# Patient Record
Sex: MALE | Race: WHITE | Employment: FULL TIME | URBAN - NONMETROPOLITAN AREA
[De-identification: names, ages, dates, MRNs, and addresses within clinical notes are randomized per-mention and may not be internally consistent; named-entity substitution may affect disease eponyms.]

---

## 2023-07-26 ENCOUNTER — HOSPITAL ENCOUNTER (EMERGENCY)
Age: 23
Discharge: HOME | End: 2023-07-26
Payer: COMMERCIAL

## 2023-07-26 VITALS
RESPIRATION RATE: 16 BRPM | HEIGHT: 73 IN | SYSTOLIC BLOOD PRESSURE: 126 MMHG | TEMPERATURE: 98.8 F | OXYGEN SATURATION: 95 % | WEIGHT: 305 LBS | HEART RATE: 72 BPM | DIASTOLIC BLOOD PRESSURE: 86 MMHG | BODY MASS INDEX: 40.42 KG/M2

## 2023-07-26 DIAGNOSIS — J06.9 ACUTE UPPER RESPIRATORY INFECTION: ICD-10-CM

## 2023-07-26 DIAGNOSIS — J30.9 ALLERGIC RHINITIS, UNSPECIFIED SEASONALITY, UNSPECIFIED TRIGGER: Primary | ICD-10-CM

## 2023-07-26 PROCEDURE — 99203 OFFICE O/P NEW LOW 30 MIN: CPT

## 2023-07-26 RX ORDER — PREDNISONE 20 MG/1
TABLET ORAL
Qty: 10 TABLET | Refills: 0 | Status: SHIPPED | OUTPATIENT
Start: 2023-07-26 | End: 2023-08-02

## 2023-07-26 ASSESSMENT — ENCOUNTER SYMPTOMS
COUGH: 1
RHINORRHEA: 1
SORE THROAT: 0
CHEST TIGHTNESS: 0
VOMITING: 0
DIARRHEA: 0
WHEEZING: 0
TROUBLE SWALLOWING: 0
SHORTNESS OF BREATH: 0
NAUSEA: 0
EYE REDNESS: 0
EYE DISCHARGE: 0

## 2023-07-26 ASSESSMENT — PAIN - FUNCTIONAL ASSESSMENT: PAIN_FUNCTIONAL_ASSESSMENT: NONE - DENIES PAIN

## 2023-07-26 NOTE — ED NOTES
Patient understood instructions verbally,  Follow up with PCP with any concerns, e-script, Worse cough with fevers, follow up with ED.ambulated self to lobby,stable condition. All belongings with patient.       Kuldeep Hughes LPN  [id number]